# Patient Record
Sex: FEMALE | Race: OTHER | HISPANIC OR LATINO | URBAN - METROPOLITAN AREA
[De-identification: names, ages, dates, MRNs, and addresses within clinical notes are randomized per-mention and may not be internally consistent; named-entity substitution may affect disease eponyms.]

---

## 2023-01-11 PROBLEM — O09.92 HIGH-RISK PREGNANCY IN SECOND TRIMESTER: Status: ACTIVE | Noted: 2023-01-11

## 2023-01-11 PROBLEM — O09.522 MULTIGRAVIDA OF ADVANCED MATERNAL AGE IN SECOND TRIMESTER: Status: ACTIVE | Noted: 2023-01-11

## 2023-01-11 PROBLEM — O99.211 OBESITY AFFECTING PREGNANCY IN FIRST TRIMESTER: Status: ACTIVE | Noted: 2023-01-11

## 2023-03-01 PROBLEM — O09.522 MULTIGRAVIDA OF ADVANCED MATERNAL AGE IN SECOND TRIMESTER: Status: ACTIVE | Noted: 2023-03-01

## 2023-03-01 PROBLEM — O99.212 OBESITY AFFECTING PREGNANCY IN SECOND TRIMESTER: Status: ACTIVE | Noted: 2023-03-01

## 2023-03-10 ENCOUNTER — SOCIAL WORK (OUTPATIENT)
Dept: ADMINISTRATIVE | Facility: OTHER | Age: 33
End: 2023-03-10

## 2023-03-10 NOTE — PROGRESS NOTES
SW completed pt's notification of pregnancy and faxed/scanned into epic. No other needs identified at this time.     Sommer Hurley,MSW  Pager#[phone]

## 2023-04-12 PROBLEM — O16.3 ELEVATED BLOOD PRESSURE AFFECTING PREGNANCY IN THIRD TRIMESTER, ANTEPARTUM: Status: ACTIVE | Noted: 2023-04-12

## 2023-04-26 PROBLEM — O99.013 ANEMIA AFFECTING PREGNANCY IN THIRD TRIMESTER: Status: ACTIVE | Noted: 2023-04-26

## 2023-05-31 PROBLEM — O14.93 PRE-ECLAMPSIA IN THIRD TRIMESTER: Status: ACTIVE | Noted: 2023-04-12

## 2023-06-01 PROBLEM — O32.9XX0 MALPRESENTATION OF FETUS: Status: RESOLVED | Noted: 2023-06-01 | Resolved: 2023-06-01

## 2023-06-01 PROBLEM — O32.9XX0 MALPRESENTATION OF FETUS: Status: ACTIVE | Noted: 2023-06-01

## 2023-06-01 PROBLEM — Q51.3 BICORNUATE UTERUS: Status: ACTIVE | Noted: 2023-06-01

## 2023-09-04 PROBLEM — O14.93 PRE-ECLAMPSIA IN THIRD TRIMESTER: Status: RESOLVED | Noted: 2023-04-12 | Resolved: 2023-09-04
